# Patient Record
Sex: MALE | Race: WHITE | NOT HISPANIC OR LATINO | Employment: OTHER | URBAN - METROPOLITAN AREA
[De-identification: names, ages, dates, MRNs, and addresses within clinical notes are randomized per-mention and may not be internally consistent; named-entity substitution may affect disease eponyms.]

---

## 2017-01-25 PROBLEM — Z01.818 PREOPERATIVE CLEARANCE: Status: ACTIVE | Noted: 2017-01-25

## 2017-03-20 PROBLEM — R35.1 NOCTURIA: Status: ACTIVE | Noted: 2017-03-20

## 2017-03-20 PROBLEM — Z98.49 S/P CATARACT SURGERY: Status: ACTIVE | Noted: 2017-03-20

## 2017-03-20 PROBLEM — R73.9 HYPERGLYCEMIA: Status: ACTIVE | Noted: 2017-03-20

## 2017-03-20 PROBLEM — R39.15 URGENCY OF URINATION: Status: ACTIVE | Noted: 2017-03-20

## 2017-03-20 PROBLEM — Z01.818 PREOPERATIVE CLEARANCE: Status: RESOLVED | Noted: 2017-01-25 | Resolved: 2017-03-20

## 2017-03-20 PROBLEM — R09.82 PND (POST-NASAL DRIP): Status: ACTIVE | Noted: 2017-03-20

## 2017-04-12 DIAGNOSIS — M25.561 PAIN IN BOTH KNEES, UNSPECIFIED CHRONICITY: Primary | ICD-10-CM

## 2017-04-12 DIAGNOSIS — M25.562 PAIN IN BOTH KNEES, UNSPECIFIED CHRONICITY: Primary | ICD-10-CM

## 2017-04-17 ENCOUNTER — OFFICE VISIT (OUTPATIENT)
Dept: ORTHOPEDICS | Facility: CLINIC | Age: 72
End: 2017-04-17
Payer: COMMERCIAL

## 2017-04-17 VITALS
SYSTOLIC BLOOD PRESSURE: 178 MMHG | HEIGHT: 67 IN | HEART RATE: 66 BPM | WEIGHT: 238 LBS | DIASTOLIC BLOOD PRESSURE: 82 MMHG | BODY MASS INDEX: 37.35 KG/M2

## 2017-04-17 DIAGNOSIS — M17.12 ARTHRITIS OF KNEE, LEFT: ICD-10-CM

## 2017-04-17 DIAGNOSIS — M17.11 ARTHRITIS OF KNEE, RIGHT: Primary | ICD-10-CM

## 2017-04-17 PROCEDURE — 99203 OFFICE O/P NEW LOW 30 MIN: CPT | Mod: S$GLB,,, | Performed by: ORTHOPAEDIC SURGERY

## 2017-04-17 NOTE — LETTER
April 17, 2017      MD Saadia Barragan III[address]  [facility] [place] [address]           [facility]  [address] [facility] [place] [address]  Phone: [phone]  Fax: [phone]          Patient: Anish Kent   MR Number: [id number]   YOB: 1945   Date of Visit: 4/17/2017       Dear Dr. Gustabo Abreu III:    Thank you for referring Anish Kent to me for evaluation. Attached you will find relevant portions of my assessment and plan of care.    If you have questions, please do not hesitate to call me. I look forward to following Anish Kent along with you.    Sincerely,    Peterson Encarnacion MD    Enclosure  CC:  No Recipients    If you would like to receive this communication electronically, please contact [email or web address] or [phone] to request more information on [employer] access.    For providers and/or their staff who would like to refer a patient to [facility], please contact us through our one-stop-shop provider referral line, [facility], at [phone].    If you feel you have received this communication in error or would no longer like to receive these types of communications, please e-mail [email or web address]

## 2017-04-17 NOTE — PROGRESS NOTES
"Past Medical History:   Diagnosis Date    Arthritis     C-spine "severe"    Carotid artery stenosis 2013    60% jorge    Colon polyp     Coronary artery disease 7/31/15    left 30-40%, right 50%    Encounter for long-term (current) use of medications     Hyperlipidemia     Hypertension     Right bundle branch block        Past Surgical History:   Procedure Laterality Date    CATARACT EXTRACTION, BILATERAL      COLONOSCOPY  9/17/08    polyp    COLONOSCOPY  7/15/15     mass       Current Outpatient Prescriptions   Medication Sig    aspirin (ECOTRIN) 325 MG EC tablet Take 325 mg by mouth once daily.    atorvastatin (LIPITOR) 40 MG tablet TAKE ONE TABLET BY MOUTH ONCE DAILY    fluticasone (FLONASE) 50 mcg/actuation nasal spray 2 sprays by Each Nare route daily as needed for Rhinitis or Allergies.    lisinopril (PRINIVIL,ZESTRIL) 40 MG tablet Take 1 tablet (40 mg total) by mouth once daily.    multivitamin (ONE DAILY MULTIVITAMIN) per tablet Take 1 tablet by mouth once daily.    tamsulosin (FLOMAX) 0.4 mg Cp24 Take 1 capsule (0.4 mg total) by mouth once daily.     No current facility-administered medications for this visit.        Review of patient's allergies indicates:  No Known Allergies    Family History   Problem Relation Age of Onset    Hypertension Mother     Heart attack Father     Heart attack Brother     Cancer Brother        Social History     Social History    Marital status:      Spouse name: N/A    Number of children: N/A    Years of education: N/A     Occupational History     Retired     Social History Main Topics    Smoking status: Never Smoker    Smokeless tobacco: Not on file    Alcohol use Not on file    Drug use: Not on file    Sexual activity: Not on file     Other Topics Concern    Not on file     Social History Narrative       Chief Complaint:   Chief Complaint   Patient presents with    Knee Pain     bilateral knee pain       Consulting Physician: Brady" "Gustabo ANGUIANO MD    History of present illness:    This is a 72 y.o. year old male who complains of bilateral knee pain with the right being worse than the left.  He states that he's had this for years.  He puts his pain in the right today at a 2 out of 10 but states that it can be worse with activities.    Review of Systems:    Constitution: Denies chills, fever, and sweats.  HENT: Denies headaches or blurry vision.  Cardiovascular: Denies chest pain or irregular heart beat.  Respiratory: Denies cough or shortness of breath.  Gastrointestinal: Denies abdominal pain, nausea, or vomiting.  Musculoskeletal:  Denies muscle cramps.  Neurological: Denies dizziness or focal weakness.  Psychiatric/Behavioral: Normal mental status.  Hematologic/Lymphatic: Denies bleeding problem or easy bruising/bleeding.  Skin: Denies rash or suspicious lesions.    Examination:    Vital Signs:    Vitals:    04/17/17 1005   BP: (!) 178/82   Pulse: 66   Weight: 108 kg (238 lb)   Height: 5' 7" (1.702 m)   PainSc:   2   PainLoc: Knee       Body mass index is 37.28 kg/(m^2).    This a well-developed, well nourished patient in no acute distress.    Alert and oriented and cooperative to examination.       Physical Exam: Left Knee Exam    Gait   Antalgic    Skin  Rash:   None  Scars:   None    Inspection  Erythema:  None  Bruising:  None  Effusion:  None  Masses:  None  Lymphadenopathy: None    Range of Motion: 0 to 130°    Medial Joint : y  Lateral Joint : n    Patellofemoral Tenderness: Yes  Patellofemoral Crepitus: Yes    Lachman:  Normal  Anterior Drawer: Normal  Posterior Drawer: Normal    Faiza's:  Negative  Apley's:  Negative    Varus Stress:  Stable  Valgus Stress:  Stable    Strength:  5/5    Pulses:  Palpable  Sensation:  Intact      Right Knee Exam    Gait   Antalgic    Skin  Rash:   None  Scars:   None    Inspection  Erythema:  None  Bruising:  None  Effusion:  None  Masses:  None  Lymphadenopathy: None    Range of " Motion: 0 to 130°    Medial Joint : y  Lateral Joint : n    Patellofemoral Tenderness: Yes  Patellofemoral Crepitus: Yes    Lachman:  Normal  Anterior Drawer: Normal  Posterior Drawer: Normal    Faiza's:  Negative  Apley's:  Negative    Varus Stress:  Stable  Valgus Stress:  Stable    Strength:  5/5    Pulses:  Palpable  Sensation:  Intact          Imaging: X-rays ordered and reviewed today of both knees show moderate to severe degenerative changes of the right knee mostly on the medial side and mild to moderate degenerative changes of the left knee mostly in the medial compartment.        Assessment: Arthritis of knee, right    Arthritis of knee, left    Other orders  -     Cancel: Large Joint Aspiration/Injection        Plan:  His pain is minimal this point time.  He has full range of motion and good strength.  We suggested with the level of arthritis that he has that he have Euflexxa series done.  We'll go ahead and order that and see him back in 2 weeks' time.      DISCLAIMER: This note may have been dictated using voice recognition software and may contain grammatical errors.     NOTE: Consult report sent to referring provider via [employer] EMR.

## 2017-05-01 ENCOUNTER — OFFICE VISIT (OUTPATIENT)
Dept: ORTHOPEDICS | Facility: CLINIC | Age: 72
End: 2017-05-01
Payer: COMMERCIAL

## 2017-05-01 VITALS — HEIGHT: 67 IN | BODY MASS INDEX: 37.35 KG/M2 | WEIGHT: 238 LBS

## 2017-05-01 DIAGNOSIS — M17.11 ARTHRITIS OF KNEE, RIGHT: Primary | ICD-10-CM

## 2017-05-01 PROCEDURE — 99499 UNLISTED E&M SERVICE: CPT | Mod: S$GLB,,, | Performed by: ORTHOPAEDIC SURGERY

## 2017-05-03 PROCEDURE — 20610 DRAIN/INJ JOINT/BURSA W/O US: CPT | Mod: RT,S$GLB,, | Performed by: ORTHOPAEDIC SURGERY

## 2017-05-03 RX ORDER — HYALURONATE SODIUM 20 MG/2 ML
20 SYRINGE (ML) INTRAARTICULAR
Status: DISCONTINUED | OUTPATIENT
Start: 2017-05-03 | End: 2017-05-03 | Stop reason: HOSPADM

## 2017-05-03 RX ADMIN — Medication 20 MG: at 02:05

## 2017-05-03 NOTE — PROCEDURES
Large Joint Aspiration/Injection  Date/Time: 5/3/2017 2:37 PM  Performed by: CURTIS HARRY  Authorized by: CURTIS HARRY     Consent Done?:  Yes (Verbal)  Indications:  Pain  Timeout: Prior to procedure the correct patient, procedure, and site was verified      Location:  Knee  Site:  R knee  Prep: Patient was prepped and draped in usual sterile fashion    Approach:  Anteromedial  Medications:  20 mg EUFLEXXA 10 mg/mL(mw 2.4 -3.6 million)

## 2017-05-03 NOTE — PROGRESS NOTES
"Past Medical History:   Diagnosis Date    Arthritis     C-spine "severe"    Carotid artery stenosis 2013    60% jorge    Colon polyp     Coronary artery disease 7/31/15    left 30-40%, right 50%    Encounter for long-term (current) use of medications     Hyperlipidemia     Hypertension     Right bundle branch block        Past Surgical History:   Procedure Laterality Date    CATARACT EXTRACTION, BILATERAL      COLONOSCOPY  9/17/08    polyp    COLONOSCOPY  7/15/15     mass       Current Outpatient Prescriptions   Medication Sig    aspirin (ECOTRIN) 325 MG EC tablet Take 325 mg by mouth once daily.    atorvastatin (LIPITOR) 40 MG tablet TAKE ONE TABLET BY MOUTH ONCE DAILY    fluticasone (FLONASE) 50 mcg/actuation nasal spray 2 sprays by Each Nare route daily as needed for Rhinitis or Allergies.    lisinopril (PRINIVIL,ZESTRIL) 40 MG tablet Take 1 tablet (40 mg total) by mouth once daily.    multivitamin (ONE DAILY MULTIVITAMIN) per tablet Take 1 tablet by mouth once daily.    tamsulosin (FLOMAX) 0.4 mg Cp24 Take 1 capsule (0.4 mg total) by mouth once daily.     No current facility-administered medications for this visit.        Review of patient's allergies indicates:  No Known Allergies    Family History   Problem Relation Age of Onset    Hypertension Mother     Heart attack Father     Heart attack Brother     Cancer Brother        Social History     Social History    Marital status:      Spouse name: N/A    Number of children: N/A    Years of education: N/A     Occupational History     Retired     Social History Main Topics    Smoking status: Never Smoker    Smokeless tobacco: Not on file    Alcohol use Not on file    Drug use: Not on file    Sexual activity: Not on file     Other Topics Concern    Not on file     Social History Narrative       Chief Complaint:   Chief Complaint   Patient presents with    Injections     euflexxa 1 of 3 right knee       Consulting Physician: " "Peterson Encarnacion MD    History of present illness:    This is a 72 y.o. year old male who complains of bilateral knee pain with the right being worse than the left.  He states that he's had this for years.  He puts his pain in the right today at a 2 out of 10 but states that it can be worse with activities.    Review of Systems:    Constitution: Denies chills, fever, and sweats.  HENT: Denies headaches or blurry vision.  Cardiovascular: Denies chest pain or irregular heart beat.  Respiratory: Denies cough or shortness of breath.  Gastrointestinal: Denies abdominal pain, nausea, or vomiting.  Musculoskeletal:  Denies muscle cramps.  Neurological: Denies dizziness or focal weakness.  Psychiatric/Behavioral: Normal mental status.  Hematologic/Lymphatic: Denies bleeding problem or easy bruising/bleeding.  Skin: Denies rash or suspicious lesions.    Examination:    Vital Signs:    Vitals:    05/01/17 1008   Weight: 108 kg (238 lb)   Height: 5' 7" (1.702 m)   PainSc:   2   PainLoc: Knee       Body mass index is 37.28 kg/(m^2).    This a well-developed, well nourished patient in no acute distress.    Alert and oriented and cooperative to examination.       Physical Exam: Left Knee Exam    Gait   Antalgic    Skin  Rash:   None  Scars:   None    Inspection  Erythema:  None  Bruising:  None  Effusion:  None  Masses:  None  Lymphadenopathy: None    Range of Motion: 0 to 130°    Medial Joint : y  Lateral Joint : n    Patellofemoral Tenderness: Yes  Patellofemoral Crepitus: Yes    Lachman:  Normal  Anterior Drawer: Normal  Posterior Drawer: Normal    Faiza's:  Negative  Apley's:  Negative    Varus Stress:  Stable  Valgus Stress:  Stable    Strength:  5/5    Pulses:  Palpable  Sensation:  Intact      Right Knee Exam    Gait   Antalgic    Skin  Rash:   None  Scars:   None    Inspection  Erythema:  None  Bruising:  None  Effusion:  None  Masses:  None  Lymphadenopathy: None    Range of Motion: 0 to " 130°    Medial Joint : y  Lateral Joint : n    Patellofemoral Tenderness: Yes  Patellofemoral Crepitus: Yes    Lachman:  Normal  Anterior Drawer: Normal  Posterior Drawer: Normal    Faiza's:  Negative  Apley's:  Negative    Varus Stress:  Stable  Valgus Stress:  Stable    Strength:  5/5    Pulses:  Palpable  Sensation:  Intact          Imaging: X-rays ordered and reviewed today of both knees show moderate to severe degenerative changes of the right knee mostly on the medial side and mild to moderate degenerative changes of the left knee mostly in the medial compartment.        Assessment: Arthritis of knee, right  -     Large Joint Aspiration/Injection        Plan: Euflexxa series     DISCLAIMER: This note may have been dictated using voice recognition software and may contain grammatical errors.     NOTE: Consult report sent to referring provider via [employer] EMR.

## 2017-05-08 ENCOUNTER — OFFICE VISIT (OUTPATIENT)
Dept: ORTHOPEDICS | Facility: CLINIC | Age: 72
End: 2017-05-08
Payer: COMMERCIAL

## 2017-05-08 VITALS — BODY MASS INDEX: 37.35 KG/M2 | WEIGHT: 238 LBS | HEIGHT: 67 IN

## 2017-05-08 DIAGNOSIS — M17.11 ARTHRITIS OF KNEE, RIGHT: Primary | ICD-10-CM

## 2017-05-08 PROCEDURE — 99499 UNLISTED E&M SERVICE: CPT | Mod: S$GLB,,, | Performed by: ORTHOPAEDIC SURGERY

## 2017-05-09 PROCEDURE — 20610 DRAIN/INJ JOINT/BURSA W/O US: CPT | Mod: 50,S$GLB,, | Performed by: ORTHOPAEDIC SURGERY

## 2017-05-09 RX ORDER — HYALURONATE SODIUM 20 MG/2 ML
20 SYRINGE (ML) INTRAARTICULAR
Status: DISCONTINUED | OUTPATIENT
Start: 2017-05-09 | End: 2017-05-09 | Stop reason: HOSPADM

## 2017-05-09 RX ADMIN — Medication 20 MG: at 01:05

## 2017-05-09 NOTE — PROCEDURES
Large Joint Aspiration/Injection  Date/Time: 5/9/2017 1:24 PM  Performed by: CURTIS HARRY  Authorized by: CURTIS HARRY     Consent Done?:  Yes (Verbal)  Indications:  Pain  Timeout: Prior to procedure the correct patient, procedure, and site was verified      Location:  Knee  Site:  R knee and L knee  Prep: Patient was prepped and draped in usual sterile fashion    Approach:  Anteromedial  Medications:  20 mg EUFLEXXA 10 mg/mL(mw 2.4 -3.6 million); 20 mg EUFLEXXA 10 mg/mL(mw 2.4 -3.6 million)

## 2017-05-09 NOTE — PROGRESS NOTES
"Past Medical History:   Diagnosis Date    Arthritis     C-spine "severe"    Carotid artery stenosis 2013    60% jorge    Colon polyp     Coronary artery disease 7/31/15    left 30-40%, right 50%    Encounter for long-term (current) use of medications     Hyperlipidemia     Hypertension     Right bundle branch block        Past Surgical History:   Procedure Laterality Date    CATARACT EXTRACTION, BILATERAL      COLONOSCOPY  9/17/08    polyp    COLONOSCOPY  7/15/15     mass       Current Outpatient Prescriptions   Medication Sig    aspirin (ECOTRIN) 325 MG EC tablet Take 325 mg by mouth once daily.    atorvastatin (LIPITOR) 40 MG tablet TAKE ONE TABLET BY MOUTH ONCE DAILY    fluticasone (FLONASE) 50 mcg/actuation nasal spray 2 sprays by Each Nare route daily as needed for Rhinitis or Allergies.    lisinopril (PRINIVIL,ZESTRIL) 40 MG tablet Take 1 tablet (40 mg total) by mouth once daily.    multivitamin (ONE DAILY MULTIVITAMIN) per tablet Take 1 tablet by mouth once daily.    tamsulosin (FLOMAX) 0.4 mg Cp24 Take 1 capsule (0.4 mg total) by mouth once daily.     No current facility-administered medications for this visit.        Review of patient's allergies indicates:  No Known Allergies    Family History   Problem Relation Age of Onset    Hypertension Mother     Heart attack Father     Heart attack Brother     Cancer Brother        Social History     Social History    Marital status:      Spouse name: N/A    Number of children: N/A    Years of education: N/A     Occupational History     Retired     Social History Main Topics    Smoking status: Never Smoker    Smokeless tobacco: Not on file    Alcohol use Not on file    Drug use: Not on file    Sexual activity: Not on file     Other Topics Concern    Not on file     Social History Narrative       Chief Complaint:   Chief Complaint   Patient presents with    Injections     Euflexxa #2 Right Knee        Consulting Physician: " "Peterson Encarnacion MD    History of present illness:    This is a 72 y.o. year old male who complains of bilateral knee pain with the right being worse than the left.  Pain 0/10.    Review of Systems:    Constitution: Denies chills, fever, and sweats.  HENT: Denies headaches or blurry vision.  Cardiovascular: Denies chest pain or irregular heart beat.  Respiratory: Denies cough or shortness of breath.  Gastrointestinal: Denies abdominal pain, nausea, or vomiting.  Musculoskeletal:  Denies muscle cramps.  Neurological: Denies dizziness or focal weakness.  Psychiatric/Behavioral: Normal mental status.  Hematologic/Lymphatic: Denies bleeding problem or easy bruising/bleeding.  Skin: Denies rash or suspicious lesions.    Examination:    Vital Signs:    Vitals:    05/08/17 1023   Weight: 108 kg (238 lb)   Height: 5' 7" (1.702 m)   PainSc: 0-No pain   PainLoc: Knee       Body mass index is 37.28 kg/(m^2).    This a well-developed, well nourished patient in no acute distress.    Alert and oriented and cooperative to examination.       Right Knee Exam    Gait   normal    Skin  Rash:   None  Scars:   None    Inspection  Erythema:  None  Bruising:  None  Effusion:  None  Masses:  None  Lymphadenopathy: None    Range of Motion: 0 to 130°    Medial Joint : y  Lateral Joint : n    Patellofemoral Tenderness: Yes  Patellofemoral Crepitus: Yes    Lachman:  Normal  Anterior Drawer: Normal  Posterior Drawer: Normal    Faiza's:  Negative  Apley's:  Negative    Varus Stress:  Stable  Valgus Stress:  Stable    Strength:  5/5    Pulses:  Palpable  Sensation:  Intact          Imaging: X-rays of both knees show moderate to severe degenerative changes of the right knee mostly on the medial side and mild to moderate degenerative changes of the left knee mostly in the medial compartment.        Assessment: Right knee arthritis    Plan: Euflexxa series right knee    DISCLAIMER: This note may have been dictated using " voice recognition software and may contain grammatical errors.     NOTE: Consult report sent to referring provider via [employer] EMR.

## 2017-05-15 ENCOUNTER — OFFICE VISIT (OUTPATIENT)
Dept: ORTHOPEDICS | Facility: CLINIC | Age: 72
End: 2017-05-15
Payer: COMMERCIAL

## 2017-05-15 VITALS — WEIGHT: 238.13 LBS | HEIGHT: 67 IN | BODY MASS INDEX: 37.37 KG/M2

## 2017-05-15 DIAGNOSIS — M17.11 ARTHRITIS OF KNEE, RIGHT: Primary | ICD-10-CM

## 2017-05-15 PROCEDURE — 99499 UNLISTED E&M SERVICE: CPT | Mod: S$GLB,,, | Performed by: ORTHOPAEDIC SURGERY

## 2017-05-15 PROCEDURE — 20610 DRAIN/INJ JOINT/BURSA W/O US: CPT | Mod: RT,S$GLB,, | Performed by: ORTHOPAEDIC SURGERY

## 2017-05-15 RX ORDER — HYALURONATE SODIUM 20 MG/2 ML
20 SYRINGE (ML) INTRAARTICULAR
Status: DISCONTINUED | OUTPATIENT
Start: 2017-05-15 | End: 2017-05-15 | Stop reason: HOSPADM

## 2017-05-15 RX ADMIN — Medication 20 MG: at 03:05

## 2017-05-15 NOTE — PROCEDURES
Large Joint Aspiration/Injection  Date/Time: 5/15/2017 3:45 PM  Performed by: CURTIS HARRY  Authorized by: CURTIS HARRY     Consent Done?:  Yes (Verbal)  Indications:  Pain  Timeout: Prior to procedure the correct patient, procedure, and site was verified      Location:  Knee  Site:  R knee  Prep: Patient was prepped and draped in usual sterile fashion    Approach:  Anterolateral  Medications:  20 mg EUFLEXXA 10 mg/mL(mw 2.4 -3.6 million)

## 2017-05-15 NOTE — PROGRESS NOTES
"Past Medical History:   Diagnosis Date    Arthritis     C-spine "severe"    Carotid artery stenosis 2013    60% jorge    Colon polyp     Coronary artery disease 7/31/15    left 30-40%, right 50%    Encounter for long-term (current) use of medications     Hyperlipidemia     Hypertension     Right bundle branch block        Past Surgical History:   Procedure Laterality Date    CATARACT EXTRACTION, BILATERAL      COLONOSCOPY  9/17/08    polyp    COLONOSCOPY  7/15/15     mass       Current Outpatient Prescriptions   Medication Sig    aspirin (ECOTRIN) 325 MG EC tablet Take 325 mg by mouth once daily.    atorvastatin (LIPITOR) 40 MG tablet TAKE ONE TABLET BY MOUTH ONCE DAILY    fluticasone (FLONASE) 50 mcg/actuation nasal spray 2 sprays by Each Nare route daily as needed for Rhinitis or Allergies.    lisinopril (PRINIVIL,ZESTRIL) 40 MG tablet Take 1 tablet (40 mg total) by mouth once daily.    multivitamin (ONE DAILY MULTIVITAMIN) per tablet Take 1 tablet by mouth once daily.    tamsulosin (FLOMAX) 0.4 mg Cp24 Take 1 capsule (0.4 mg total) by mouth once daily.     No current facility-administered medications for this visit.        Review of patient's allergies indicates:  No Known Allergies    Family History   Problem Relation Age of Onset    Hypertension Mother     Heart attack Father     Heart attack Brother     Cancer Brother        Social History     Social History    Marital status:      Spouse name: N/A    Number of children: N/A    Years of education: N/A     Occupational History     Retired     Social History Main Topics    Smoking status: Never Smoker    Smokeless tobacco: Not on file    Alcohol use Not on file    Drug use: Not on file    Sexual activity: Not on file     Other Topics Concern    Not on file     Social History Narrative       Chief Complaint:   Chief Complaint   Patient presents with    Injections     Euflexxa #3 (Bilateral Knee)       Consulting Physician: " "Peterson Encarnacion MD    History of present illness:    This is a 72 y.o. year old male who complains of bilateral knee pain with the right being worse than the left.  Pain 0/10 today.    Review of Systems:    Constitution: Denies chills, fever, and sweats.  HENT: Denies headaches or blurry vision.  Cardiovascular: Denies chest pain or irregular heart beat.  Respiratory: Denies cough or shortness of breath.  Gastrointestinal: Denies abdominal pain, nausea, or vomiting.  Musculoskeletal:  Denies muscle cramps.  Neurological: Denies dizziness or focal weakness.  Psychiatric/Behavioral: Normal mental status.  Hematologic/Lymphatic: Denies bleeding problem or easy bruising/bleeding.  Skin: Denies rash or suspicious lesions.    Examination:    Vital Signs:    Vitals:    05/15/17 1016   Weight: 108 kg (238 lb 1.6 oz)   Height: 5' 7" (1.702 m)   PainLoc: Knee       Body mass index is 37.29 kg/(m^2).    This a well-developed, well nourished patient in no acute distress.    Alert and oriented and cooperative to examination.       Right Knee Exam    Gait   normal    Skin  Rash:   None  Scars:   None    Inspection  Erythema:  None  Bruising:  None  Effusion:  None  Masses:  None  Lymphadenopathy: None    Range of Motion: 0 to 130°    Medial Joint : y  Lateral Joint : n    Patellofemoral Tenderness: Yes  Patellofemoral Crepitus: Yes    Lachman:  Normal  Anterior Drawer: Normal  Posterior Drawer: Normal    Faiza's:  Negative  Apley's:  Negative    Varus Stress:  Stable  Valgus Stress:  Stable    Strength:  5/5    Pulses:  Palpable  Sensation:  Intact          Imaging: X-rays of both knees show moderate to severe degenerative changes of the right knee mostly on the medial side and mild to moderate degenerative changes of the left knee mostly in the medial compartment.        Assessment: Arthritis of knee, right  -     Large Joint Aspiration/Injection        Plan: Euflexxa series     DISCLAIMER: This note " may have been dictated using voice recognition software and may contain grammatical errors.     NOTE: Consult report sent to referring provider via [employer] EMR.

## 2017-05-22 ENCOUNTER — TELEPHONE (OUTPATIENT)
Dept: ORTHOPEDICS | Facility: CLINIC | Age: 72
End: 2017-05-22

## 2017-05-22 NOTE — TELEPHONE ENCOUNTER
----- Message from Althea Pond sent at 5/22/2017 11:25 AM CDT -----  Contact: [employer]  Patient is not in pain at this time and wants to wait for another shot. Any questions call patient at [phone].

## 2017-06-08 PROBLEM — Z00.01 ENCOUNTER FOR GENERAL ADULT MEDICAL EXAMINATION WITH ABNORMAL FINDINGS: Status: ACTIVE | Noted: 2017-06-08

## 2017-09-08 PROBLEM — R39.15 URGENCY OF URINATION: Status: RESOLVED | Noted: 2017-03-20 | Resolved: 2017-09-08

## 2017-09-11 PROBLEM — Z00.01 ENCOUNTER FOR GENERAL ADULT MEDICAL EXAMINATION WITH ABNORMAL FINDINGS: Status: RESOLVED | Noted: 2017-06-08 | Resolved: 2017-09-11

## 2018-03-20 PROBLEM — R09.82 PND (POST-NASAL DRIP): Status: RESOLVED | Noted: 2017-03-20 | Resolved: 2018-03-20

## 2018-03-20 PROBLEM — Z98.49 S/P CATARACT SURGERY: Status: RESOLVED | Noted: 2017-03-20 | Resolved: 2018-03-20

## 2018-03-20 PROBLEM — R35.1 NOCTURIA: Status: RESOLVED | Noted: 2017-03-20 | Resolved: 2018-03-20

## 2018-10-30 PROBLEM — E55.9 VITAMIN D INSUFFICIENCY: Status: ACTIVE | Noted: 2018-10-30

## 2019-05-21 ENCOUNTER — OFFICE VISIT (OUTPATIENT)
Dept: SURGERY | Facility: CLINIC | Age: 74
End: 2019-05-21
Payer: MEDICARE

## 2019-05-21 VITALS
SYSTOLIC BLOOD PRESSURE: 142 MMHG | HEART RATE: 56 BPM | WEIGHT: 236 LBS | DIASTOLIC BLOOD PRESSURE: 68 MMHG | OXYGEN SATURATION: 94 % | BODY MASS INDEX: 37.04 KG/M2 | RESPIRATION RATE: 18 BRPM | HEIGHT: 67 IN

## 2019-05-21 DIAGNOSIS — K59.00 CONSTIPATION, UNSPECIFIED CONSTIPATION TYPE: Primary | ICD-10-CM

## 2019-05-21 PROCEDURE — 99213 OFFICE O/P EST LOW 20 MIN: CPT | Mod: S$GLB,,, | Performed by: SURGERY

## 2019-05-21 PROCEDURE — 3077F SYST BP >= 140 MM HG: CPT | Mod: CPTII,S$GLB,, | Performed by: SURGERY

## 2019-05-21 PROCEDURE — 3077F PR MOST RECENT SYSTOLIC BLOOD PRESSURE >= 140 MM HG: ICD-10-PCS | Mod: CPTII,S$GLB,, | Performed by: SURGERY

## 2019-05-21 PROCEDURE — 99213 PR OFFICE/OUTPT VISIT, EST, LEVL III, 20-29 MIN: ICD-10-PCS | Mod: S$GLB,,, | Performed by: SURGERY

## 2019-05-21 PROCEDURE — 3078F PR MOST RECENT DIASTOLIC BLOOD PRESSURE < 80 MM HG: ICD-10-PCS | Mod: CPTII,S$GLB,, | Performed by: SURGERY

## 2019-05-21 PROCEDURE — 3078F DIAST BP <80 MM HG: CPT | Mod: CPTII,S$GLB,, | Performed by: SURGERY

## 2019-05-21 RX ORDER — LACTULOSE 10 G/15ML
30 SOLUTION ORAL 2 TIMES DAILY
Qty: 2700 ML | Refills: 11 | Status: SHIPPED | OUTPATIENT
Start: 2019-05-21 | End: 2020-05-20

## 2019-05-21 NOTE — PROGRESS NOTES
"Subjective:       Patient ID: Anish Kent is a 74 y.o. male.    Chief Complaint: Follow-up (Issues with bowels)      HPI:  Mr. Kent presents today with severe constipation.  He has tried over-the-counter stool softeners and MiraLax without benefit.  No diarrhea.    No melena, hematochezia, hematemesis.  No nausea or vomiting.  No aggravating factors.  No alleviating factors.  He has also noted increasing flatus with in increasing malodorus quality.  No other associated symptoms.  He had a left hemicolectomy on 1/10/2018 for a large sigmoid colon submucosal lipoma and chronic diverticulitis.      Allergies & Meds:  Review of patient's allergies indicates:  No Known Allergies    Current Outpatient Medications   Medication Sig Dispense Refill    amLODIPine (NORVASC) 10 MG tablet Take 1 tablet (10 mg total) by mouth once daily. 90 tablet 1    aspirin (ECOTRIN) 81 MG EC tablet Take 81 mg by mouth once daily.      atorvastatin (LIPITOR) 40 MG tablet Take 1 tablet (40 mg total) by mouth once daily. 90 tablet 1    cholecalciferol, vitamin D3, (VITAMIN D3) 1,000 unit capsule Take 1 capsule (1,000 Units total) by mouth once daily. (Patient taking differently: Take 2,000 Units by mouth once daily. )  0    fluticasone (FLONASE) 50 mcg/actuation nasal spray 2 sprays by Each Nare route daily as needed for Rhinitis or Allergies. 16 g 11    lisinopril (PRINIVIL,ZESTRIL) 40 MG tablet TAKE ONE TABLET BY MOUTH ONCE DAILY 90 tablet 3    multivitamin (ONE DAILY MULTIVITAMIN) per tablet Take 1 tablet by mouth once daily.      lactulose (CHRONULAC) 20 gram/30 mL Soln Take 45 mLs (30 g total) by mouth 2 (two) times daily. 2700 mL 11     No current facility-administered medications for this visit.        PMFSHx:  Past Medical History:   Diagnosis Date    Arthritis     C-spine "severe" and bilateral knees    Carotid artery stenosis 2013    60% jorge    Cataract 01/2017    bilateral cataract removal    Colon polyp     " Coronary artery disease 7/31/15    left 30-40%, right 50%    Encounter for long-term (current) use of medications     Hyperlipidemia     Hypertension     Psoriasis     Right bundle branch block        Past Surgical History:   Procedure Laterality Date    APPENDECTOMY      CATARACT EXTRACTION, BILATERAL      COLON SURGERY  2018    left hemicolectomy     COLONOSCOPY  08    polyp    COLONOSCOPY  7/15/15     mass       Family History   Problem Relation Age of Onset    Hypertension Mother     Heart attack Father     Heart attack Brother     Cancer Brother     Hyperlipidemia Brother        Social History     Tobacco Use    Smoking status: Former Smoker     Packs/day: 1.00     Years: 50.00     Pack years: 50.00     Types: Cigarettes     Last attempt to quit: 2005     Years since quittin.9   Substance Use Topics    Alcohol use: No    Drug use: No       Review of Systems   Constitutional: Negative for appetite change, chills, fatigue, fever and unexpected weight change.   HENT: Negative for congestion, dental problem, ear pain, mouth sores, postnasal drip, rhinorrhea, sore throat, tinnitus, trouble swallowing and voice change.    Eyes: Negative for photophobia, pain, discharge and visual disturbance.   Respiratory: Negative for cough, chest tightness, shortness of breath and wheezing.    Cardiovascular: Negative for chest pain, palpitations and leg swelling.   Gastrointestinal: Negative for abdominal pain, blood in stool, diarrhea, nausea and vomiting.   Endocrine: Negative for cold intolerance, heat intolerance, polydipsia, polyphagia and polyuria.   Genitourinary: Negative for difficulty urinating, dysuria, flank pain, frequency, hematuria and urgency.   Musculoskeletal: Negative for arthralgias, joint swelling and myalgias.   Skin: Negative for color change and rash.   Allergic/Immunologic: Negative for immunocompromised state.   Neurological: Negative for dizziness, tremors,  seizures, syncope, speech difficulty, weakness, numbness and headaches.   Hematological: Negative for adenopathy. Does not bruise/bleed easily.   Psychiatric/Behavioral: Negative for agitation, confusion, hallucinations, self-injury and suicidal ideas. The patient is not nervous/anxious.        Objective:      Physical Exam   Constitutional: He is oriented to person, place, and time. He appears well-developed and well-nourished. He is active.  Non-toxic appearance. He does not appear ill. No distress.   Body mass index is 36.96 kg/m².     HENT:   Head: Normocephalic and atraumatic. Head is without contusion.   Right Ear: Hearing and external ear normal. No drainage or tenderness.   Left Ear: Hearing and external ear normal. No drainage or tenderness.   Nose: Nose normal. No rhinorrhea.   Eyes: Pupils are equal, round, and reactive to light. Conjunctivae and lids are normal. Right eye exhibits no discharge and no exudate. Left eye exhibits no discharge and no exudate. Right conjunctiva is not injected. Left conjunctiva is not injected.   Neck: Trachea normal, normal range of motion and phonation normal. No JVD present. Carotid bruit is not present. No tracheal deviation and no edema present. No thyroid mass and no thyromegaly present.   Cardiovascular: Normal rate, regular rhythm, S1 normal, S2 normal, normal heart sounds and intact distal pulses. PMI is not displaced. Exam reveals no gallop and no friction rub.   No murmur heard.  Pulmonary/Chest: Effort normal and breath sounds normal. No accessory muscle usage. No tachypnea. No respiratory distress. He exhibits no mass, no tenderness, no crepitus and no retraction. Right breast exhibits no inverted nipple, no mass, no nipple discharge and no skin change. Left breast exhibits no inverted nipple, no mass, no nipple discharge and no skin change. Breasts are symmetrical.   Abdominal: Soft. Normal appearance and bowel sounds are normal. He exhibits no distension, no  fluid wave, no abdominal bruit and no mass. There is no hepatosplenomegaly. There is no tenderness. No hernia. Hernia confirmed negative in the ventral area, confirmed negative in the right inguinal area and confirmed negative in the left inguinal area.   Genitourinary: Testes normal and penis normal. Right testis shows no mass and no swelling. Left testis shows no mass and no swelling.   Musculoskeletal:        Cervical back: Normal.        Thoracic back: Normal.        Lumbar back: Normal.        Right upper arm: Normal.        Left upper arm: Normal.        Right forearm: Normal.        Left forearm: Normal.        Right hand: Normal.        Left hand: Normal.        Right upper leg: Normal.        Left upper leg: Normal.        Right lower leg: Normal.        Left lower leg: Normal.        Right foot: Normal.        Left foot: Normal.   Lymphadenopathy:        Head (right side): No submental and no submandibular adenopathy present.        Head (left side): No submental and no submandibular adenopathy present.     He has no cervical adenopathy.     He has no axillary adenopathy. No inguinal adenopathy noted on the right or left side.        Right: No inguinal and no supraclavicular adenopathy present.        Left: No inguinal and no supraclavicular adenopathy present.   Neurological: He is alert and oriented to person, place, and time. He has normal strength. He is not disoriented. He displays no atrophy and no tremor. No cranial nerve deficit or sensory deficit. Coordination and gait normal. GCS eye subscore is 4. GCS verbal subscore is 5. GCS motor subscore is 6.   Skin: Skin is warm, dry and intact. No lesion and no rash noted. No cyanosis. Nails show no clubbing.   Psychiatric: He has a normal mood and affect. His speech is normal and behavior is normal. Thought content normal. He is not actively hallucinating. He is attentive.           Assessment:       Chronic constipation.  Trial of lactulose warranted.   If non beneficial further evaluation with a CT scan of the abdomen pelvis and possibly colonoscopy will be warranted.    1. Constipation, unspecified constipation type        Plan:   Constipation, unspecified constipation type    Other orders  -     lactulose (CHRONULAC) 20 gram/30 mL Soln; Take 45 mLs (30 g total) by mouth 2 (two) times daily.  Dispense: 2700 mL; Refill: 11    Begin Colace 2 100 mg capsules twice a day with a big 16 oz glass of water each time.  Began over-the-counter probiotic ([employer] ultimate Amirah Colon  Care 80 Billion Colonies per casule) daily    Further management will depend upon clinical course.  Persistent symptoms will be evaluated with a CT of the abdomen pelvis with oral and IV contrast and then subsequently a colonoscopy.    Follow up in about 2 weeks (around 6/4/2019).

## 2019-06-04 ENCOUNTER — OFFICE VISIT (OUTPATIENT)
Dept: SURGERY | Facility: CLINIC | Age: 74
End: 2019-06-04
Payer: MEDICARE

## 2019-06-04 VITALS
WEIGHT: 237.19 LBS | OXYGEN SATURATION: 95 % | SYSTOLIC BLOOD PRESSURE: 140 MMHG | HEIGHT: 67 IN | TEMPERATURE: 98 F | RESPIRATION RATE: 18 BRPM | HEART RATE: 61 BPM | BODY MASS INDEX: 37.23 KG/M2 | DIASTOLIC BLOOD PRESSURE: 67 MMHG

## 2019-06-04 DIAGNOSIS — K59.00 CONSTIPATION, UNSPECIFIED CONSTIPATION TYPE: Primary | ICD-10-CM

## 2019-06-04 PROCEDURE — 3077F PR MOST RECENT SYSTOLIC BLOOD PRESSURE >= 140 MM HG: ICD-10-PCS | Mod: CPTII,S$GLB,, | Performed by: SURGERY

## 2019-06-04 PROCEDURE — 3077F SYST BP >= 140 MM HG: CPT | Mod: CPTII,S$GLB,, | Performed by: SURGERY

## 2019-06-04 PROCEDURE — 99212 OFFICE O/P EST SF 10 MIN: CPT | Mod: S$GLB,,, | Performed by: SURGERY

## 2019-06-04 PROCEDURE — 99212 PR OFFICE/OUTPT VISIT, EST, LEVL II, 10-19 MIN: ICD-10-PCS | Mod: S$GLB,,, | Performed by: SURGERY

## 2019-06-04 PROCEDURE — 3078F DIAST BP <80 MM HG: CPT | Mod: CPTII,S$GLB,, | Performed by: SURGERY

## 2019-06-04 PROCEDURE — 3078F PR MOST RECENT DIASTOLIC BLOOD PRESSURE < 80 MM HG: ICD-10-PCS | Mod: CPTII,S$GLB,, | Performed by: SURGERY

## 2019-06-04 NOTE — PROGRESS NOTES
Subjective:       Patient ID: Anish Kent is a 74 y.o. male.    Chief Complaint: Follow-up (Constipation)      HPI:  Mr. Kent returns today with no complaints.  He was last seen on 5/21/2019 with severe constipation.  He was prescribed lactulose and instructed to begin Colace and probiotics.  Since his last visit constipation has resolved.  He is now having daily soft formed brown stools.  He did have some transient diarrhea after initially starting the therapy that has now resolved.  No melena or hematochezia.  No abdominal pain. No nausea or vomiting.  No anorectal pain.  Appetite is excellent.  Weight is stable.      Allergies & Meds:  Review of patient's allergies indicates:  No Known Allergies    Current Outpatient Medications   Medication Sig Dispense Refill    amLODIPine (NORVASC) 10 MG tablet Take 1 tablet (10 mg total) by mouth once daily. 90 tablet 1    aspirin (ECOTRIN) 81 MG EC tablet Take 81 mg by mouth once daily.      atorvastatin (LIPITOR) 40 MG tablet Take 1 tablet (40 mg total) by mouth once daily. 90 tablet 1    cholecalciferol, vitamin D3, (VITAMIN D3) 1,000 unit capsule Take 1 capsule (1,000 Units total) by mouth once daily. (Patient taking differently: Take 2,000 Units by mouth once daily. )  0    fluticasone (FLONASE) 50 mcg/actuation nasal spray 2 sprays by Each Nare route daily as needed for Rhinitis or Allergies. 16 g 11    lactulose (CHRONULAC) 20 gram/30 mL Soln Take 45 mLs (30 g total) by mouth 2 (two) times daily. 2700 mL 11    lisinopril (PRINIVIL,ZESTRIL) 40 MG tablet TAKE ONE TABLET BY MOUTH ONCE DAILY 90 tablet 3    multivitamin (ONE DAILY MULTIVITAMIN) per tablet Take 1 tablet by mouth once daily.       No current facility-administered medications for this visit.        PMFSHx:  Past medical history, surgical history, family history, social history reviewed and no changes noted.        Review of Systems   Constitutional: Negative for appetite change, chills,  fatigue, fever and unexpected weight change.   HENT: Negative for congestion, dental problem, ear pain, mouth sores, postnasal drip, rhinorrhea, sore throat, tinnitus, trouble swallowing and voice change.    Eyes: Negative for photophobia, pain, discharge and visual disturbance.   Respiratory: Negative for cough, chest tightness, shortness of breath and wheezing.    Cardiovascular: Negative for chest pain, palpitations and leg swelling.   Gastrointestinal: Negative for abdominal pain, blood in stool, constipation, diarrhea, nausea and vomiting.   Endocrine: Negative for cold intolerance, heat intolerance, polydipsia, polyphagia and polyuria.   Genitourinary: Negative for difficulty urinating, dysuria, flank pain, frequency, hematuria and urgency.   Musculoskeletal: Negative for arthralgias, joint swelling and myalgias.   Skin: Negative for color change and rash.   Allergic/Immunologic: Negative for immunocompromised state.   Neurological: Negative for dizziness, tremors, seizures, syncope, speech difficulty, weakness, numbness and headaches.   Hematological: Negative for adenopathy. Does not bruise/bleed easily.   Psychiatric/Behavioral: Negative for agitation, confusion, hallucinations, self-injury and suicidal ideas. The patient is not nervous/anxious.        Objective:      Physical Exam   Constitutional: He appears well-developed and well-nourished.  Non-toxic appearance. He does not appear ill. No distress.   Cardiovascular: Normal rate, regular rhythm and normal heart sounds. PMI is not displaced. Exam reveals no gallop.   No murmur heard.  Pulmonary/Chest: Effort normal and breath sounds normal. No accessory muscle usage. No tachypnea. No respiratory distress.   Abdominal: Soft. Normal appearance and bowel sounds are normal. There is no tenderness. No hernia.   Skin: Skin is warm, dry and intact. No rash noted.         Assessment:       Resolved constipation.    1. Constipation, unspecified constipation type         Plan:   Constipation, unspecified constipation type      Continue lactulose, Colace, and probiotics.    Follow up for any concerns or questions as needed, resume care with PCP.

## 2020-05-06 ENCOUNTER — TELEPHONE (OUTPATIENT)
Dept: UROLOGY | Facility: CLINIC | Age: 75
End: 2020-05-06

## 2020-05-08 ENCOUNTER — TELEPHONE (OUTPATIENT)
Dept: UROLOGY | Facility: CLINIC | Age: 75
End: 2020-05-08

## 2020-05-08 NOTE — TELEPHONE ENCOUNTER
Call placed to inform patient we [facility], no answer, unable to leave message.

## 2020-05-12 ENCOUNTER — LAB VISIT (OUTPATIENT)
Dept: LAB | Facility: HOSPITAL | Age: 75
End: 2020-05-12
Attending: UROLOGY
Payer: MEDICARE

## 2020-05-12 ENCOUNTER — OFFICE VISIT (OUTPATIENT)
Dept: UROLOGY | Facility: CLINIC | Age: 75
End: 2020-05-12
Payer: OTHER GOVERNMENT

## 2020-05-12 VITALS
HEIGHT: 67 IN | BODY MASS INDEX: 37.02 KG/M2 | TEMPERATURE: 98 F | DIASTOLIC BLOOD PRESSURE: 78 MMHG | HEART RATE: 65 BPM | SYSTOLIC BLOOD PRESSURE: 151 MMHG | WEIGHT: 235.88 LBS | RESPIRATION RATE: 18 BRPM

## 2020-05-12 DIAGNOSIS — N28.89 RENAL MASS: ICD-10-CM

## 2020-05-12 DIAGNOSIS — N40.0 BENIGN PROSTATIC HYPERPLASIA WITHOUT LOWER URINARY TRACT SYMPTOMS: Primary | ICD-10-CM

## 2020-05-12 LAB
BILIRUB SERPL-MCNC: NORMAL MG/DL
BLOOD URINE, POC: NORMAL
COLOR, POC UA: YELLOW
CREAT SERPL-MCNC: 0.8 MG/DL (ref 0.5–1.4)
EST. GFR  (AFRICAN AMERICAN): >60 ML/MIN/1.73 M^2
EST. GFR  (NON AFRICAN AMERICAN): >60 ML/MIN/1.73 M^2
GLUCOSE UR QL STRIP: NORMAL
KETONES UR QL STRIP: NORMAL
LEUKOCYTE ESTERASE URINE, POC: NORMAL
NITRITE, POC UA: NORMAL
PH, POC UA: 7
PROTEIN, POC: NORMAL
SPECIFIC GRAVITY, POC UA: 1.01
UROBILINOGEN, POC UA: 0.2

## 2020-05-12 PROCEDURE — 99999 PR PBB SHADOW E&M-EST. PATIENT-LVL III: CPT | Mod: PBBFAC,,, | Performed by: UROLOGY

## 2020-05-12 PROCEDURE — 99204 OFFICE O/P NEW MOD 45 MIN: CPT | Mod: 25,,, | Performed by: UROLOGY

## 2020-05-12 PROCEDURE — 99999 PR PBB SHADOW E&M-EST. PATIENT-LVL III: ICD-10-PCS | Mod: PBBFAC,,, | Performed by: UROLOGY

## 2020-05-12 PROCEDURE — 82565 ASSAY OF CREATININE: CPT

## 2020-05-12 PROCEDURE — 81002 URINALYSIS NONAUTO W/O SCOPE: CPT | Mod: ,,, | Performed by: UROLOGY

## 2020-05-12 PROCEDURE — 99204 PR OFFICE/OUTPT VISIT, NEW, LEVL IV, 45-59 MIN: ICD-10-PCS | Mod: 25,,, | Performed by: UROLOGY

## 2020-05-12 PROCEDURE — 36415 COLL VENOUS BLD VENIPUNCTURE: CPT

## 2020-05-12 PROCEDURE — 81002 POCT URINE DIPSTICK WITHOUT MICROSCOPE: ICD-10-PCS | Mod: ,,, | Performed by: UROLOGY

## 2020-05-12 RX ORDER — METFORMIN HYDROCHLORIDE 500 MG/1
500 TABLET ORAL 2 TIMES DAILY WITH MEALS
COMMUNITY

## 2020-05-12 RX ORDER — FUROSEMIDE 20 MG/1
20 TABLET ORAL 2 TIMES DAILY
COMMUNITY

## 2020-05-12 NOTE — LETTER
May 12, 2020      [facility]  [address] [facility] [address]           [facility]  [address] [place] [address] [facility] [address]  Phone: [phone]  Fax: [phone]          Patient: Anish Kent   MR Number: [id number]   YOB: 1945   Date of Visit: 5/12/2020       Dear [facility]:    Thank you for referring Anish Kent to me for evaluation. Attached you will find relevant portions of my assessment and plan of care.    If you have questions, please do not hesitate to call me. I look forward to following Anish Kent along with you.    Sincerely,    Paolo Banuelos MD    Enclosure  CC:  No Recipients    If you would like to receive this communication electronically, please contact [email or web address] or [phone] to request more information on [employer] access.    For providers and/or their staff who would like to refer a patient to [facility], please contact us through our one-stop-shop provider referral line, [facility], at [phone].    If you feel you have received this communication in error or would no longer like to receive these types of communications, please e-mail [email or web address]

## 2020-05-12 NOTE — PROGRESS NOTES
"[unfilled]  [id number]  AGE:  75 y.o.     05/12/2020      DRUG ALLERGIES:  NKDA    CHIEF COMPLAINT:  Renal lesion or mass    HISTORY OF PRESENT ILLNESS:  This 75-year-old male was told 4-5 months ago that he had a renal lesion or mass he does not have any of the imaging studies available and does not remember distinctly  what was done.  Denies any flank pain.  He also has history BPH for which he was treated with Flomax and Proscar but developed blood pressure problems and were discontinued and at present he states he is voiding quite satisfactorily does not feeling need to be on any medication.  Denies any other prior  history.    MEDICAL HISTORY:  Hypertension, cardiac disease with bundle branch block, arthritis, carotid artery stenosis, high cholesterol, psoriasis.     PAST SURGICAL HISTORY:  Right carotid artery surgery stenosis, cataract surgery, appendectomy, colon procedure.    PRESENT MEDICATIONS:  Lipitor, Norvasc, aspirin, Colace, Lasix, lisinopril, metformin    FAMILY HISTORY:  Negative     SOCIAL HISTORY:  Retired  with 2 healthy daughters.  Wife has a history of liver cancer and lymphoma.     REVIEW OF SYMPTOMS:  GENERAL:  No weight change.  Good appetite.  HEAD AND NECK:  No headaches.  Wears prescription glasses.  CARDIORESPIRATORY:  No chest pain or shortness of breath no cough   GASTROINTESTINAL:  No trouble swallowing no constipation or diarrhea.  MUSCULOSKELETAL:  No joint or back problems.    PHYSICAL EXAMINATION:  VITAL SIGNS:  BP (!) 151/78   Pulse 65   Temp 98 °F (36.7 °C) (Oral)   Resp 18   Ht 5' 7" (1.702 m)   Wt 107 kg (235 lb 14.3 oz)   BMI 36.95 kg/m²   GENERAL:  Well-developed, well-nourished 75 y.o.  -year-old male, alert, oriented, cooperative, in no acute distress.  HEAD AND NECK:  Throat clear.  No adenopathy.  CHEST:  Clear.  HEART:  Regular.  No murmur.  ABDOMEN:  Soft, prominent, benign and nontender.  No masses.  No hernias.  No organomegaly.  EXTERNAL GENITAL:  " Normal phallus with adequate meatus.  Testes descended and   feel normal.  No scrotal masses.  RECTAL:  A 25 g smooth prostate.  No nodules.  Normal sphincter tone.    UA - negative pH 7.0    PSA  - 0.55 on 10/2019    FINAL IMPRESSION:  Questionable renal mass or density.  BPH appears to be stable.     RECOMMENDATIONS:  CT scan abdomen pelvis with and without contrast and will contact patient with the .

## 2020-05-14 ENCOUNTER — HOSPITAL ENCOUNTER (OUTPATIENT)
Dept: RADIOLOGY | Facility: HOSPITAL | Age: 75
Discharge: HOME | End: 2020-05-14
Attending: UROLOGY
Payer: MEDICARE

## 2020-05-14 DIAGNOSIS — N28.89 RENAL MASS: ICD-10-CM

## 2020-05-14 PROCEDURE — 74178 CT ABD&PLV WO CNTR FLWD CNTR: CPT | Mod: 26,,, | Performed by: RADIOLOGY

## 2020-05-14 PROCEDURE — 74178 CT ABDOMEN PELVIS W WO CONTRAST: ICD-10-PCS | Mod: 26,,, | Performed by: RADIOLOGY

## 2020-05-14 PROCEDURE — 25500020 PHARM REV CODE 255

## 2020-05-14 PROCEDURE — 74178 CT ABD&PLV WO CNTR FLWD CNTR: CPT | Mod: TC

## 2020-05-14 RX ADMIN — IOHEXOL 100 ML: 350 INJECTION, SOLUTION INTRAVENOUS at 03:05

## 2020-05-15 DIAGNOSIS — N28.89 BILATERAL RENAL MASSES: Primary | ICD-10-CM

## 2020-05-18 DIAGNOSIS — N28.89 LEFT RENAL MASS: Primary | ICD-10-CM

## 2020-05-18 NOTE — PROGRESS NOTES
[facility] from [facility]21/2020 reviewed - complex cyst in the lower pole the left kidney which appears similar to the to the finding of the CT scan on 09/02/2015.  Although the possibility of a neoplasm cannot be completely ruled out.    Rec - MRI of the abdomen

## 2020-05-19 ENCOUNTER — TELEPHONE (OUTPATIENT)
Dept: UROLOGY | Facility: CLINIC | Age: 75
End: 2020-05-19

## 2020-05-19 NOTE — TELEPHONE ENCOUNTER
----- Message from Jose Pearson sent at 5/19/2020  8:44 AM CDT -----  Contact: self   Placed call to pod, patient miss call from your office please call back at [phone] (home)     Case number [id number]

## 2020-05-21 ENCOUNTER — TELEPHONE (OUTPATIENT)
Dept: UROLOGY | Facility: CLINIC | Age: 75
End: 2020-05-21

## 2020-05-21 NOTE — TELEPHONE ENCOUNTER
The pt called stating he needs to cancel the MRI that is scheduled for tomorrow because of billing issues with the [facility]. He states he will call back to reschedule after the issue is resolved.

## 2020-05-28 ENCOUNTER — TELEPHONE (OUTPATIENT)
Dept: UROLOGY | Facility: CLINIC | Age: 75
End: 2020-05-28

## 2020-05-28 NOTE — TELEPHONE ENCOUNTER
Spoke with patient, informed the MRI has been approved by the [facility] and we can schedule it. Patient states he is driving , when he gets home he will call back and schedule, patient verbally understood.

## 2020-06-10 ENCOUNTER — HOSPITAL ENCOUNTER (OUTPATIENT)
Dept: RADIOLOGY | Facility: HOSPITAL | Age: 75
Discharge: HOME | End: 2020-06-10
Attending: UROLOGY
Payer: OTHER GOVERNMENT

## 2020-06-10 DIAGNOSIS — N28.89 LEFT RENAL MASS: ICD-10-CM

## 2020-06-10 DIAGNOSIS — N28.89 BILATERAL RENAL MASSES: ICD-10-CM

## 2020-06-10 PROCEDURE — 25500020 PHARM REV CODE 255: Performed by: UROLOGY

## 2020-06-10 PROCEDURE — 74183 MRI ABDOMEN W WO CONTRAST: ICD-10-PCS | Mod: 26,,, | Performed by: RADIOLOGY

## 2020-06-10 PROCEDURE — 76770 US EXAM ABDO BACK WALL COMP: CPT | Mod: 26,,, | Performed by: RADIOLOGY

## 2020-06-10 PROCEDURE — 76770 US RETROPERITONEAL COMPLETE: ICD-10-PCS | Mod: 26,,, | Performed by: RADIOLOGY

## 2020-06-10 PROCEDURE — A9585 GADOBUTROL INJECTION: HCPCS | Performed by: UROLOGY

## 2020-06-10 PROCEDURE — 74183 MRI ABD W/O CNTR FLWD CNTR: CPT | Mod: 26,,, | Performed by: RADIOLOGY

## 2020-06-10 PROCEDURE — 74183 MRI ABD W/O CNTR FLWD CNTR: CPT | Mod: TC

## 2020-06-10 PROCEDURE — 76770 US EXAM ABDO BACK WALL COMP: CPT | Mod: TC

## 2020-06-10 RX ORDER — GADOBUTROL 604.72 MG/ML
10 INJECTION INTRAVENOUS
Status: COMPLETED | OUTPATIENT
Start: 2020-06-10 | End: 2020-06-10

## 2020-06-10 RX ADMIN — GADOBUTROL 10 ML: 604.72 INJECTION INTRAVENOUS at 09:06

## 2020-07-22 ENCOUNTER — TELEPHONE (OUTPATIENT)
Dept: UROLOGY | Facility: CLINIC | Age: 75
End: 2020-07-22

## 2020-07-22 NOTE — TELEPHONE ENCOUNTER
Call placed to inform patient we received approval from [facility] for follow up appt, but we need to move the appt to sooner date as appt is scheduled for 12/10/20 and the approval will  on 20. We need to reschedule to sooner date before 20. No answer, unable to leave message as mailbox is full, will attempt again.

## 2020-07-22 NOTE — TELEPHONE ENCOUNTER
Patient returned call, follow up appt made sooner before before [facility] referral expires, patient verbally understood.

## 2021-03-05 ENCOUNTER — TELEPHONE (OUTPATIENT)
Dept: UROLOGY | Facility: CLINIC | Age: 76
End: 2021-03-05

## 2021-03-17 ENCOUNTER — TELEPHONE (OUTPATIENT)
Dept: UROLOGY | Facility: CLINIC | Age: 76
End: 2021-03-17

## 2021-04-13 ENCOUNTER — OFFICE VISIT (OUTPATIENT)
Dept: UROLOGY | Facility: CLINIC | Age: 76
End: 2021-04-13
Payer: OTHER GOVERNMENT

## 2021-04-13 VITALS
WEIGHT: 221 LBS | OXYGEN SATURATION: 97 % | DIASTOLIC BLOOD PRESSURE: 65 MMHG | BODY MASS INDEX: 34.69 KG/M2 | HEART RATE: 64 BPM | TEMPERATURE: 98 F | SYSTOLIC BLOOD PRESSURE: 120 MMHG | RESPIRATION RATE: 14 BRPM | HEIGHT: 67 IN

## 2021-04-13 DIAGNOSIS — I65.23 BILATERAL CAROTID ARTERY STENOSIS: ICD-10-CM

## 2021-04-13 DIAGNOSIS — E78.5 HYPERLIPIDEMIA, UNSPECIFIED HYPERLIPIDEMIA TYPE: ICD-10-CM

## 2021-04-13 DIAGNOSIS — N28.1 RENAL CYST, LEFT: Primary | ICD-10-CM

## 2021-04-13 DIAGNOSIS — I10 ESSENTIAL HYPERTENSION: ICD-10-CM

## 2021-04-13 PROCEDURE — 99999 PR PBB SHADOW E&M-EST. PATIENT-LVL V: CPT | Mod: PBBFAC,,, | Performed by: STUDENT IN AN ORGANIZED HEALTH CARE EDUCATION/TRAINING PROGRAM

## 2021-04-13 PROCEDURE — 99999 PR PBB SHADOW E&M-EST. PATIENT-LVL V: ICD-10-PCS | Mod: PBBFAC,,, | Performed by: STUDENT IN AN ORGANIZED HEALTH CARE EDUCATION/TRAINING PROGRAM

## 2021-04-13 PROCEDURE — 99213 PR OFFICE/OUTPT VISIT, EST, LEVL III, 20-29 MIN: ICD-10-PCS | Mod: S$PBB,,, | Performed by: STUDENT IN AN ORGANIZED HEALTH CARE EDUCATION/TRAINING PROGRAM

## 2021-04-13 PROCEDURE — 99213 OFFICE O/P EST LOW 20 MIN: CPT | Mod: S$PBB,,, | Performed by: STUDENT IN AN ORGANIZED HEALTH CARE EDUCATION/TRAINING PROGRAM

## 2021-04-13 PROCEDURE — 99215 OFFICE O/P EST HI 40 MIN: CPT | Mod: PBBFAC | Performed by: STUDENT IN AN ORGANIZED HEALTH CARE EDUCATION/TRAINING PROGRAM

## 2021-04-13 RX ORDER — AMOXICILLIN 500 MG
CAPSULE ORAL DAILY
COMMUNITY

## 2021-04-13 RX ORDER — PNV NO.95/FERROUS FUM/FOLIC AC 28MG-0.8MG
100 TABLET ORAL DAILY
COMMUNITY

## 2021-05-05 ENCOUNTER — HOSPITAL ENCOUNTER (OUTPATIENT)
Dept: RADIOLOGY | Facility: HOSPITAL | Age: 76
Discharge: HOME | End: 2021-05-05
Attending: STUDENT IN AN ORGANIZED HEALTH CARE EDUCATION/TRAINING PROGRAM
Payer: OTHER GOVERNMENT

## 2021-05-05 DIAGNOSIS — N28.1 RENAL CYST, LEFT: ICD-10-CM

## 2021-05-05 PROCEDURE — 76770 US EXAM ABDO BACK WALL COMP: CPT | Mod: TC

## 2021-05-05 PROCEDURE — 76770 US RETROPERITONEAL COMPLETE: ICD-10-PCS | Mod: 26,,, | Performed by: RADIOLOGY

## 2021-05-05 PROCEDURE — 76770 US EXAM ABDO BACK WALL COMP: CPT | Mod: 26,,, | Performed by: RADIOLOGY

## 2021-07-08 ENCOUNTER — TELEPHONE (OUTPATIENT)
Dept: UROLOGY | Facility: CLINIC | Age: 76
End: 2021-07-08